# Patient Record
Sex: MALE | Race: WHITE | NOT HISPANIC OR LATINO | Employment: OTHER | URBAN - METROPOLITAN AREA
[De-identification: names, ages, dates, MRNs, and addresses within clinical notes are randomized per-mention and may not be internally consistent; named-entity substitution may affect disease eponyms.]

---

## 2022-03-10 ENCOUNTER — TELEPHONE (OUTPATIENT)
Dept: NEUROSURGERY | Facility: CLINIC | Age: 57
End: 2022-03-10

## 2022-03-10 NOTE — TELEPHONE ENCOUNTER
NEW PT_2ND OP_LUMBAR PAIN_ NO RECENT IMAGING IN CHART @ THIS TIME.     PT CALLED AND STATES HE WOULD LIKE TO SCHEDULE AN APPT WITH DR. ESPINAL.  INFORMED PT HE WOULD NEED REFERRAL AND IMAGING WITHIN THE LAST YEAR.  PT IS AWARE THAT THE IMAGING REPORT, OVN AND PREV RECORDS ARE NEEDED BEFORE WE CAN SEND REFERRAL TO REVIEW FOR 2ND OP.   THIS APPOINTMENT WILL BE CONSIDERED A 2ND OP BECAUSE HE WAS RECENTLY SEEN BY NS DR. RAMACHANDRAN 11/29/21.  PT HAD A PREV LAMINECTOMY L3-4 AND 4-5 IN [place] IN 1997 HE THINKS AND THINKS HE HAD ANOTHER LAMINECTOMY IN 98 AND THAT WAS DONE IN [place], [place]. HE THINKS.  UNABLE TO OBTAIN PREV SX RECORDS DUE TO NOT ENOUGH INFO AND AGE OF RECORDS.      PT STATES THIS IS NOT MV/WC RELATED.      PT STATES HE HAD AN MRI RECENTLY WITHIN A YEAR WITH XRS AND CTS OF LUMBAR REGION WITH DR. RAMACHANDRAN.    PT WAS GETTING LUMBAR REFERRAL, IMAGING AND OVN FROM DR. RAMACHANDRAN.     PT WISHES TO SEE DR. ESPINAL.      PT WAS NOT FOUND IN [facility].    PT REQUIRES NO CALL BACK @ THIS TIME.